# Patient Record
Sex: FEMALE | Race: WHITE | NOT HISPANIC OR LATINO | URBAN - METROPOLITAN AREA
[De-identification: names, ages, dates, MRNs, and addresses within clinical notes are randomized per-mention and may not be internally consistent; named-entity substitution may affect disease eponyms.]

---

## 2021-04-14 DIAGNOSIS — Z23 ENCOUNTER FOR IMMUNIZATION: ICD-10-CM

## 2024-05-22 ENCOUNTER — HOSPITAL ENCOUNTER (OUTPATIENT)
Facility: CLINIC | Age: 74
Discharge: HOME | End: 2024-05-22
Attending: FAMILY MEDICINE
Payer: MEDICARE

## 2024-05-22 VITALS
OXYGEN SATURATION: 97 % | TEMPERATURE: 97.6 F | HEART RATE: 72 BPM | DIASTOLIC BLOOD PRESSURE: 84 MMHG | SYSTOLIC BLOOD PRESSURE: 145 MMHG

## 2024-05-22 DIAGNOSIS — R10.9 FLANK PAIN: ICD-10-CM

## 2024-05-22 DIAGNOSIS — M79.2 NEURALGIA: Primary | ICD-10-CM

## 2024-05-22 LAB
BILIRUBIN, POC: NEGATIVE
BLOOD URINE, POC: NEGATIVE
CLARITY, POC: CLEAR
COLOR, POC: YELLOW
EXPIRATION DATE: NORMAL
GLUCOSE URINE, POC: NEGATIVE
KETONES, POC: NEGATIVE
LEUKOCYTE EST, POC: NEGATIVE
Lab: NORMAL
NITRITE, POC: NEGATIVE
PH, POC: 7
POCT MANUFACTURER: NORMAL
PROTEIN, POC: NORMAL
SPECIFIC GRAVITY, POC: 1
UROBILINOGEN, POC: 1

## 2024-05-22 PROCEDURE — 99202 OFFICE O/P NEW SF 15 MIN: CPT | Performed by: FAMILY MEDICINE

## 2024-05-22 PROCEDURE — 81002 URINALYSIS NONAUTO W/O SCOPE: CPT | Performed by: FAMILY MEDICINE

## 2024-05-22 RX ORDER — LOSARTAN POTASSIUM 25 MG/1
25 TABLET ORAL DAILY
COMMUNITY
Start: 2024-03-21 | End: 2025-03-21

## 2024-05-22 RX ORDER — VALACYCLOVIR HYDROCHLORIDE 1 G/1
1000 TABLET, FILM COATED ORAL 3 TIMES DAILY
Qty: 21 TABLET | Refills: 0 | Status: SHIPPED | OUTPATIENT
Start: 2024-05-22 | End: 2024-05-29

## 2024-05-22 RX ORDER — CALCIUM CARBONATE/VITAMIN D3 600MG-5MCG
1 TABLET ORAL 2 TIMES DAILY
COMMUNITY

## 2024-05-22 RX ORDER — FEXOFENADINE HCL AND PSEUDOEPHEDRINE HCI 60; 120 MG/1; MG/1
1 TABLET, EXTENDED RELEASE ORAL EVERY 12 HOURS
COMMUNITY
Start: 2024-05-08

## 2024-05-22 RX ORDER — FLUTICASONE PROPIONATE 50 MCG
1 SPRAY, SUSPENSION (ML) NASAL DAILY
COMMUNITY
Start: 2024-05-08

## 2024-05-22 ASSESSMENT — ENCOUNTER SYMPTOMS
FEVER: 0
BACK PAIN: 1
FATIGUE: 0
CHILLS: 0

## 2024-05-22 NOTE — ED PROVIDER NOTES
History  Chief Complaint   Patient presents with    Back Pain     Lt side mid back pain radiating to lt side of ribs started 2 days ago.   No urinary symptoms.      Briana is a 72 yo female with a past history of similar left flank pain, ultimately found to be shingles, presenting with re-emergence of symptoms, left flank discomfort radiating anteriorly for  2 days.          History provided by:  Patient   used: No    Back Pain  Associated symptoms: no fever        No past medical history on file.    No past surgical history on file.    No family history on file.         Review of Systems   Constitutional:  Negative for chills, fatigue and fever.   Musculoskeletal:  Positive for back pain.   Skin:  Negative for rash.       Physical Exam  ED Triage Vitals [05/22/24 1116]   Temp Heart Rate Resp BP SpO2   36.4 °C (97.6 °F) 72 -- (!) 145/84 97 %      Temp src Heart Rate Source Patient Position BP Location FiO2 (%) (Set)   -- Monitor Sitting Right upper arm --       Physical Exam  Constitutional:       Appearance: Normal appearance.   Skin:     Findings: No rash.          Neurological:      Mental Status: She is alert.           Procedures  Procedures    UC Course       Medical Decision Making  UA wnl  Valacyclovir rx'ed.   Urged to follow up for further evaluation if symptoms persist/worsen.                        Vitaly Bustamante,   05/22/24 1159

## 2024-05-22 NOTE — DISCHARGE INSTRUCTIONS
Use medication as directed.   __________________________________________________________  Monitor for signs and symptoms of infection (worsening pain, redness, tenderness, swelling).  Please return for care if these symptoms arise.   __________________________________________________________  Please let us know about your experience today!   Your input is truly appreciated and helps Dr. Bustamante and your team at [facility] to continue to provide a superior level of service!   Get Well Soon!